# Patient Record
Sex: MALE | Race: BLACK OR AFRICAN AMERICAN
[De-identification: names, ages, dates, MRNs, and addresses within clinical notes are randomized per-mention and may not be internally consistent; named-entity substitution may affect disease eponyms.]

---

## 2017-10-02 ENCOUNTER — HOSPITAL ENCOUNTER (INPATIENT)
Dept: HOSPITAL 74 - YASAS | Age: 52
LOS: 4 days | Discharge: HOME | End: 2017-10-06
Attending: INTERNAL MEDICINE | Admitting: INTERNAL MEDICINE
Payer: COMMERCIAL

## 2017-10-02 VITALS — BODY MASS INDEX: 26.1 KG/M2

## 2017-10-02 DIAGNOSIS — G47.00: ICD-10-CM

## 2017-10-02 DIAGNOSIS — Z91.018: ICD-10-CM

## 2017-10-02 DIAGNOSIS — F17.213: ICD-10-CM

## 2017-10-02 DIAGNOSIS — R00.0: ICD-10-CM

## 2017-10-02 DIAGNOSIS — F25.9: ICD-10-CM

## 2017-10-02 DIAGNOSIS — F14.20: ICD-10-CM

## 2017-10-02 DIAGNOSIS — M25.562: ICD-10-CM

## 2017-10-02 DIAGNOSIS — K21.9: ICD-10-CM

## 2017-10-02 DIAGNOSIS — R76.11: ICD-10-CM

## 2017-10-02 DIAGNOSIS — F10.230: Primary | ICD-10-CM

## 2017-10-02 DIAGNOSIS — E78.5: ICD-10-CM

## 2017-10-02 LAB
APPEARANCE UR: (no result)
BILIRUB UR STRIP.AUTO-MCNC: 2 MG/DL
COLOR UR: (no result)
HYALINE CASTS URNS QL MICRO: 9 /LPF
KETONES UR QL STRIP: (no result)
LEUKOCYTE ESTERASE UR QL STRIP.AUTO: (no result)
MUCOUS THREADS URNS QL MICRO: (no result)
NITRITE UR QL STRIP: NEGATIVE
PH UR: 6 [PH] (ref 5–8)
PROT UR QL STRIP: (no result)
PROT UR QL STRIP: NEGATIVE
RBC # BLD AUTO: 2 /HPF (ref 0–3)
RBC # UR STRIP: NEGATIVE /UL
UROBILINOGEN UR STRIP-MCNC: (no result) MG/DL (ref 0.2–1)
WBC # UR AUTO: 4 /HPF (ref 3–5)

## 2017-10-02 PROCEDURE — HZ2ZZZZ DETOXIFICATION SERVICES FOR SUBSTANCE ABUSE TREATMENT: ICD-10-PCS | Performed by: SURGERY

## 2017-10-02 RX ADMIN — Medication SCH MG: at 21:42

## 2017-10-02 RX ADMIN — ATORVASTATIN CALCIUM SCH MG: 10 TABLET, FILM COATED ORAL at 21:42

## 2017-10-02 RX ADMIN — RANITIDINE SCH MG: 150 TABLET ORAL at 21:42

## 2017-10-02 NOTE — HP
CIWA Score





- CIWA Score


Nausea/Vomitin


Muscle Tremors: 4-Moderate,w/Arms Extend


Anxiety: 4-Mod. Anxious/Guarded


Agitation: 4-Moderately Restless


Paroxysmal Sweats: 1-Minimal Palms Moist


Orientation: 0-Oriented


Tacttile Disturbances: 0-None


Auditory Disturbances: 0-None


Visual Disturbances: 0-None


Headache: 0-None Present


CIWA-Ar Total Score: 15





Admission ROS [facility]





- HPI


Chief Complaint: 


WITHDRAWAL SX


Allergies/Adverse Reactions: 


 Allergies











Allergy/AdvReac Type Severity Reaction Status Date / Time


 


pork derived (porcine) Allergy Intermediate Rash Verified 10/02/17 18:07


 


No Known Drug Allergies Allergy   Verified 16 12:20











History of Present Illness: 


52 YEARS OLD MALE WITH LONG HISTORY OF ALCOHOL NICOTINE DEPENDENCE HAS POSITIVE 

PPD GERD HYPERLIPIDEMIA AND SCHIZOAFFECTIVE DISORDER IS ADMITTED TO DETOX


Exam Limitations: No Limitations





- Ebola screening


Have you traveled outside of the country in the last 21 days: No


Have you had contact with anyone from an Ebola affected area: No


Have you been sick,other than usual withdrawal symptoms: No


Do you have a fever: No





- Review of Systems


Constitutional: Changes in sleep, Weight Stable


EENT: reports: Blurred Vision (NEEDED EYE GLASSES)


Respiratory: reports: No Symptoms reported


Cardiac: reports: No Symptoms Reported


GI: reports: Nausea, Poor Fluid Intake, Indigestion, Abdominal cramping


: reports: No Symptoms Reported


Musculoskeletal: reports: Joint Pain (BOTH KNEES)


Integumentary: reports: No Symptoms Reported


Neuro: reports: Tremors


Endocrine: reports: No Symptoms Reported


Hematology: reports: No Symptoms Reported


Psychiatric: reports: Judgement Intact, Orientated x3, Anxious, Depressed


Other Systems: Reviewed and Negative





Patient History





- Patient Medical History


Hx Anemia: No


Hx Asthma: No


Hx Chronic Obstructive Pulmonary Disease (COPD): No


Hx Cancer: No


Hx Cardiac Disorders: No


Hx Congestive Heart Failure: No


Hx Hypertension: No


Hx Hypercholesterolemia: Yes (zocor 20mg)


Hx Pacemaker: No


HX Cerebrovascular Accident: No


Hx Seizures: No


Hx Dementia: No


Hx Diabetes: No


Hx Gastrointestinal Disorders: Yes (GERD)


Hx Liver Disease: No


Hx Genitourinary Disorders: No


Hx Sexually Transmitted Disorders: No


Hx Renal Disease (ESRD): No


Hx Thyroid Disease: No


Hx Human Immunodeficiency Virus (HIV): No (negative hx)


Hx Hepatitis C: No


Hx Depression: No


Hx Suicide Attempt: No


Hx Bipolar Disorder: No


Hx Schizophrenia: Yes





- Patient Surgical History


Past Surgical History: No


Hx Neurologic Surgery: No


Hx Cataract Extraction: No


Hx Cardiac Surgery: No


Hx Lung Surgery: No


Hx Breast Surgery: No


Hx Breast Biopsy: No


Hx Abdominal Surgery: No


Hx Appendectomy: No


Hx Cholecystectomy: No


Hx Genitourinary Surgery: No


Hx Orthopedic Surgery: No





- PPD History


Previous Implant?: Yes


Documented Results: Positive w/proof


Implanted On Prior [facility] Admission?: No


Date: 17


Results: NEGATIVE


PPD to be Administered?: No





- Smoking Cessation


Smoking history: Current every day smoker


Have you smoked in the past 12 months: Yes


Aproximately how many cigarettes per day: 10


Cigars Per Day: 0


Hx Chewing Tobacco Use: No


Initiated information on smoking cessation: Yes


'Breaking Loose' booklet given: 10/02/17





- Substance & Tx. History


Hx Alcohol Use: Yes


Hx Substance Use: No


Substance Use Type: Alcohol


Hx Substance Use Treatment: Yes (2017 [facility])





- Substances Abused


  ** Alcohol


Route: Oral


Frequency: Daily


Amount used: 3-4 24 oz cans beer/ 1 pint vodka


Age of first use: 11


Date of Last Use: 10/02/17





  ** Crack


Route: Smoking


Frequency: Daily


Amount used: $50-60


Age of first use: 26


Date of Last Use: 17





Family Disease History





- Family Disease History


Family Disease History: Heart Disease: Father (htn.alcohol,), Mother (

htn,), [place]: Brother (), Other: Father, Mother, Brother, Sister (

NO CONTACT)





Admission Physical Exam [facility]





- Vital Signs


Vital Signs: 


 Vital Signs - 24 hr











  10/02/17





  17:15


 


Temperature 97.5 F L


 


Pulse Rate 90


 


Respiratory 20





Rate 


 


Blood Pressure 116/98














- Physical


General Appearance: Yes: Appropriately Dressed, Moderate Distress, Tremorous, 

Irritable, Sweating, Anxious


HEENTM: Yes: Hearing grossly Normal, Normal ENT Inspection, Normocephalic, 

Normal Voice


Respiratory: Yes: Chest Non-Tender, Lungs Clear, Normal Breath Sounds, No 

Respiratory Distress, No Accessory Muscle Use


Neck: Yes: Supple, Trachea in good position


Breast: Yes: Breasts Symetrical


Cardiology: Yes: Regular Rhythm, S1, S2, Tachycardia


Abdominal: Yes: Non Tender, Soft


Genitourinary: Yes: Within Normal Limits


Back: Yes: Normal Inspection


Musculoskeletal: Yes: full range of Motion, Gait Steady, Back pain, Muscle Pain


Extremities: Yes: Normal Inspection, Normal Range of Motion, Non-Tender, Tremors


Neurological: Yes: Fully Oriented, Alert, Motor Strength 5/5, Normal Response, 

Depressed Affect


Integumentary: Yes: Warm


Lymphatic: Yes: Within Normal Limits





- Diagnostic


(1) Alcohol dependence with uncomplicated withdrawal


Current Visit: Yes   Status: Acute





(2) Nicotine dependence


Current Visit: Yes   Status: Acute   Qualifiers: 


     Nicotine product type: cigarettes     Substance use status: in withdrawal 

       Qualified Code(s): F17.213 - Nicotine dependence, cigarettes, with 

withdrawal; F17.213 - Nicotine dependence, cigarettes, with withdrawal  





(3) Schizoaffective disorder


Current Visit: Yes   Status: Suspected   Qualifiers: 


     Schizoaffective disorder type: unspecified        Qualified Code(s): F25.9 

- Schizoaffective disorder, unspecified; F25.9 - Schizoaffective disorder, 

unspecified; F25.9 - Schizoaffective disorder, unspecified; F25.9 - 

Schizoaffective disorder, unspecified  





(4) Hypercholesteremia


Current Visit: Yes   Status: Chronic





(5) Positive PPD, treated


Current Visit: Yes   Status: Resolved





(6) GERD (gastroesophageal reflux disease)


Current Visit: Yes   Status: Chronic   Qualifiers: 


     Esophagitis presence: without esophagitis        Qualified Code(s): K21.9 

- Gastro-esophageal reflux disease without esophagitis; K21.9 - Gastro-

esophageal reflux disease without esophagitis; K21.9 - Gastro-esophageal reflux 

disease without esophagitis  








Cleared for Admission [facility]





- Detox or Rehab


[facility] Level of Care: Medically Managed


Detox Regimen/Protocol: Librium





BHS Breath Alcohol Content


Breath Alcohol Content: 0





Urine Drug Screen





- Results


Drug Screen Negative: Yes

## 2017-10-03 LAB
ALBUMIN SERPL-MCNC: 3.7 G/DL (ref 3.4–5)
ALP SERPL-CCNC: 122 U/L (ref 45–117)
ALT SERPL-CCNC: 45 U/L (ref 12–78)
ANION GAP SERPL CALC-SCNC: 6 MMOL/L (ref 8–16)
AST SERPL-CCNC: 24 U/L (ref 15–37)
BILIRUB SERPL-MCNC: 1.2 MG/DL (ref 0.2–1)
CALCIUM SERPL-MCNC: 8.9 MG/DL (ref 8.5–10.1)
CO2 SERPL-SCNC: 27 MMOL/L (ref 21–32)
CREAT SERPL-MCNC: 0.9 MG/DL (ref 0.7–1.3)
DEPRECATED RDW RBC AUTO: 16.1 % (ref 11.9–15.9)
GLUCOSE SERPL-MCNC: 87 MG/DL (ref 74–106)
HIV 1 & 2 AB: NEGATIVE
HIV 1 AGP24: NEGATIVE
MCH RBC QN AUTO: 27.5 PG (ref 25.7–33.7)
MCHC RBC AUTO-ENTMCNC: 32.9 G/DL (ref 32–35.9)
MCV RBC: 83.5 FL (ref 80–96)
PLATELET # BLD AUTO: 203 K/MM3 (ref 134–434)
PMV BLD: 8.7 FL (ref 7.5–11.1)
PROT SERPL-MCNC: 6.7 G/DL (ref 6.4–8.2)
SP GR UR: 1.02 (ref 1–1.02)
WBC # BLD AUTO: 6 K/MM3 (ref 4–10)

## 2017-10-03 RX ADMIN — Medication SCH TAB: at 10:29

## 2017-10-03 RX ADMIN — ATORVASTATIN CALCIUM SCH MG: 10 TABLET, FILM COATED ORAL at 22:23

## 2017-10-03 RX ADMIN — MIRTAZAPINE SCH MG: 15 TABLET, FILM COATED ORAL at 22:24

## 2017-10-03 RX ADMIN — RANITIDINE SCH MG: 150 TABLET ORAL at 22:23

## 2017-10-03 RX ADMIN — RANITIDINE SCH MG: 150 TABLET ORAL at 10:29

## 2017-10-03 RX ADMIN — AMOXICILLIN AND CLAVULANATE POTASSIUM SCH TAB: 875; 125 TABLET, FILM COATED ORAL at 22:24

## 2017-10-03 RX ADMIN — Medication SCH MG: at 22:24

## 2017-10-03 RX ADMIN — AMOXICILLIN AND CLAVULANATE POTASSIUM SCH TAB: 875; 125 TABLET, FILM COATED ORAL at 10:28

## 2017-10-03 NOTE — PN
S CIWA





- CIWA Score


Nausea/Vomiting: 3


Muscle Tremors: 3


Anxiety: 3


Agitation: 2


Paroxysmal Sweats: 1-Minimal Palms Moist


Orientation: 0-Oriented


Tacttile Disturbances: 1-Very Mild Itch/Numbness


Auditory Disturbances: 1-Very Mild


Visual Disturbances: 0-None


Headache: 2-Mild


CIWA-Ar Total Score: 16





BHS Progress Note (SOAP)


Subjective: 


ALERT,IRRITABLE,ANXIOUS,INTERRUPTED SLEEP,TREMOR,PAIN IN THE BODY


Objective: 





10/03/17 10:02


 Vital Signs











Temperature  97.5 F L  10/03/17 06:00


 


Pulse Rate  75   10/03/17 06:00


 


Respiratory Rate  18   10/03/17 06:00


 


Blood Pressure  119/71   10/03/17 06:00


 


O2 Sat by Pulse Oximetry (%)      








EKG NSR,NORMAL ECG


 Laboratory Last Values











WBC  6.0 K/mm3 (4.0-10.0)   10/03/17  08:00    


 


RBC  4.97 M/mm3 (4.00-5.60)   10/03/17  08:00    


 


Hgb  13.6 GM/dL (11.7-16.9)   10/03/17  08:00    


 


Hct  41.5 % (35.4-49)   10/03/17  08:00    


 


MCV  83.5 fl (80-96)   10/03/17  08:00    


 


MCH  27.5 pg (25.7-33.7)   10/03/17  08:00    


 


MCHC  32.9 g/dl (32.0-35.9)   10/03/17  08:00    


 


RDW  16.1 % (11.9-15.9)  H  10/03/17  08:00    


 


Plt Count  203 K/MM3 (134-434)   10/03/17  08:00    


 


MPV  8.7 fl (7.5-11.1)   10/03/17  08:00    


 


Urine Color  Laura   10/02/17  23:10    


 


Urine Appearance  Slcloudy   10/02/17  23:10    


 


Urine pH  6.0  (5.0-8.0)   10/02/17  23:10    


 


Ur Specific Gravity  1.020  (1.005-1.025)   10/02/17  23:10    


 


Urine Protein  1+  (NEGATIVE)  H  10/02/17  23:10    


 


Urine Glucose (UA)  Negative  (NEGATIVE)   10/02/17  23:10    


 


Urine Ketones  Trace  (NEGATIVE)  H  10/02/17  23:10    


 


Urine Blood  Negative  (NEGATIVE)   10/02/17  23:10    


 


Urine Nitrite  Negative  (NEGATIVE)   10/02/17  23:10    


 


Urine Bilirubin  2.0  (NEGATIVE)   10/02/17  23:10    


 


Urine Urobilinogen  4.0 e.u/dl mg/dL (0.2-1.0)   10/02/17  23:10    


 


Urine RBC  2 /hpf (0-3)   10/02/17  23:10    


 


Urine WBC  4 /hpf (3-5)   10/02/17  23:10    


 


Ur Epithelial Cells  Rare /hpf (FEW)   10/02/17  23:10    


 


Hyaline Casts  9 /lpf  10/02/17  23:10    


 


Urine Mucus  Many   10/02/17  23:10    








LABS PENDING


Assessment: 





10/03/17 10:03


WITHDRAWAL SYMPTOM


Plan: 


CONTINUE DETOX

## 2017-10-03 NOTE — CONSULT
[facility] Psychiatric Consult





- Data


Date of interview: 10/03/17


Admission source: [facility]


Identifying data: Readmission to [facility] for this 53 y/o AA male seeking 

detox treatment on [address] for alcohol and cocaine (crack) dependence.Patient 

is single without children,homeless,unemployed and supported on SSI benefits.


Substance Abuse History: Discussed with patient in this session.Confirmed [facility] 

report.  Smoking Cessation.  Smoking history: Current every day smoker.  Have 

you smoked in the past 12 months: Yes.  Aproximately how many cigarettes per day

: 10.  Cigars Per Day: 0.  Hx Chewing Tobacco Use: No.  Initiated information 

on smoking cessation: Yes.  'Breaking Loose' booklet given: 10/02/17.  - 

Substance & Tx. History.  Hx Alcohol Use: Yes.  Hx Substance Use: No.  

Substance Use Type: Alcohol.  Hx Substance Use Treatment: Yes (7/2017 [facility]).  - 

Substances Abused.  ** Alcohol.  Route: Oral.  Frequency: Daily.  Amount used: 3

-4 24 oz cans beer/ 1 pint vodka.  Age of first use: 11.  Date of Last Use: 10/

02/17.  ** Crack.  Route: Smoking.  Frequency: Daily.  Amount used: $50-60.  

Age of first use: 26.  Date of Last Use: 09/27/17


Medical History: History of positive PPD (treated),GERD and dyslipidemia.


Psychiatric History: Diagnosed with Schizoaffective Disorder (2004).Multiple 

psychiatric hospitalizations ([facility]

).Patient reports that he gets OPD care at the [facility] 

clinic in [facility].Prescribed risperdal 4 mg/hs + remeron 45 mg/hs + celexa 20 

mg/day.Has been off medications for one month (self-report).Mr Lama admits to 

one suicide attempt (self-mutilation),years ago,at the death of his mother.


Physical/Sexual Abuse/Trauma History: No reported history of abuse.


Additional Comment: Drug Screen is negative.





Mental Status Exam





- Mental Status Exam


Alert and Oriented to: Time, Place, Person


Cognitive Function: Good


Patient Appearance: Well Groomed


Mood: Hopeful, Euthymic


Affect: Appropriate, Normal Range


Patient Behavior: Appropriate, Cooperative


Speech Pattern: Clear


Voice Loudness: Normal


Thought Process: Intact, Goal Oriented


Thought Disorder: Not Present


Hallucinations: Denies


Suicidal Ideation: Denies


Homicidal Ideation: Denies


Insight/Judgement: Poor


Sleep: Poorly, Difficulty falling asleep


Appetite: Good


Muscle strength/Tone: Normal


Gait/Station: Normal





Psychiatric Findings





- Problem List (Axis 1, 2,3)


(1) Schizoaffective disorder


Current Visit: Yes   Status: Chronic   Qualifiers: 


     Schizoaffective disorder type: unspecified        Qualified Code(s): F25.9 

- Schizoaffective disorder, unspecified; F25.9 - Schizoaffective disorder, 

unspecified; F25.9 - Schizoaffective disorder, unspecified; F25.9 - 

Schizoaffective disorder, unspecified  





(2) Alcohol dependence with uncomplicated withdrawal


Current Visit: Yes   Status: Acute





(3) Cocaine dependence, uncomplicated


Current Visit: Yes   Status: Acute





(4) Nicotine dependence


Current Visit: Yes   Status: Acute   Qualifiers: 


     Nicotine product type: cigarettes     Substance use status: in withdrawal 

       Qualified Code(s): F17.213 - Nicotine dependence, cigarettes, with 

withdrawal; F17.213 - Nicotine dependence, cigarettes, with withdrawal  





(5) GERD (gastroesophageal reflux disease)


Current Visit: Yes   Status: Chronic   Qualifiers: 


     Esophagitis presence: without esophagitis        Qualified Code(s): K21.9 

- Gastro-esophageal reflux disease without esophagitis; K21.9 - Gastro-

esophageal reflux disease without esophagitis; K21.9 - Gastro-esophageal reflux 

disease without esophagitis  





(6) Hypercholesteremia


Current Visit: Yes   Status: Chronic





(7) Positive PPD, treated


Current Visit: Yes   Status: Resolved





(8) Insomnia


Current Visit: Yes   Status: Acute   








- Initial Treatment Plan


Initial Treatment Plan: Psychoeducation.Detoxification.Medications : celexa 20 

mg po daily + remeron 15 mg po hs (titration to 45 mg will follow) + risperdal 

2 mg po bid.Side effects/benefits of each drug are discussed with the 

patient.Made aware,in particular,of potential for abnormal involuntary 

movements (dystonias,dyskinesias,akathisia),sexual impotence,galactorrhea,

gynecomastia,metabolic syndrome and cardiovascular adverse events (risperdal),

suicidal ideation/sexual dysfunction (celexa) and oversedation,orthostasis (

remeron).Patient endorses history of good tolerability to these 

medications.Insists on their continuation in this hospital course.Observation.

## 2017-10-03 NOTE — EKG
Test Reason : 

Blood Pressure : ***/*** mmHG

Vent. Rate : 078 BPM     Atrial Rate : 078 BPM

   P-R Int : 186 ms          QRS Dur : 072 ms

    QT Int : 386 ms       P-R-T Axes : 077 039 051 degrees

   QTc Int : 440 ms

 

NORMAL SINUS RHYTHM

NORMAL ECG

NO PREVIOUS ECGS AVAILABLE

Confirmed by HORTENSIA BURROUGHS MD ([id number]) on 10/3/2017 9:56:40 AM

 

Referred By:             Confirmed By:HORTENSIA BURROUGHS MD

## 2017-10-04 RX ADMIN — MIRTAZAPINE SCH MG: 15 TABLET, FILM COATED ORAL at 22:37

## 2017-10-04 RX ADMIN — RANITIDINE SCH MG: 150 TABLET ORAL at 10:18

## 2017-10-04 RX ADMIN — RANITIDINE SCH MG: 150 TABLET ORAL at 22:35

## 2017-10-04 RX ADMIN — Medication SCH TAB: at 10:18

## 2017-10-04 RX ADMIN — AMOXICILLIN AND CLAVULANATE POTASSIUM SCH TAB: 875; 125 TABLET, FILM COATED ORAL at 10:18

## 2017-10-04 RX ADMIN — Medication SCH MG: at 22:35

## 2017-10-04 RX ADMIN — ATORVASTATIN CALCIUM SCH MG: 10 TABLET, FILM COATED ORAL at 22:35

## 2017-10-04 RX ADMIN — AMOXICILLIN AND CLAVULANATE POTASSIUM SCH TAB: 875; 125 TABLET, FILM COATED ORAL at 22:35

## 2017-10-04 RX ADMIN — BACITRACIN ZINC SCH GM: 500 OINTMENT TOPICAL at 22:34

## 2017-10-04 RX ADMIN — CITALOPRAM HYDROBROMIDE SCH MG: 20 TABLET ORAL at 10:18

## 2017-10-04 NOTE — PN
S CIWA





- CIWA Score


Nausea/Vomiting: 3


Muscle Tremors: 3


Anxiety: 3


Agitation: 2


Paroxysmal Sweats: 1-Minimal Palms Moist


Orientation: 0-Oriented


Tacttile Disturbances: 1-Very Mild Itch/Numbness


Auditory Disturbances: 1-Very Mild


Visual Disturbances: 0-None


Headache: 2-Mild


CIWA-Ar Total Score: 16





BHS Progress Note (SOAP)


Subjective: 


alert,irritable,anxious,interrupted sleep,tremor,pain in the body and back


Objective: 





10/04/17 11:12


 Vital Signs











Temperature  98.3 F   10/04/17 10:06


 


Pulse Rate  70   10/04/17 10:06


 


Respiratory Rate  16   10/04/17 10:06


 


Blood Pressure  114/70   10/04/17 10:06


 


O2 Sat by Pulse Oximetry (%)      








 Laboratory Last Values











WBC  6.0 K/mm3 (4.0-10.0)   10/03/17  08:00    


 


RBC  4.97 M/mm3 (4.00-5.60)   10/03/17  08:00    


 


Hgb  13.6 GM/dL (11.7-16.9)   10/03/17  08:00    


 


Hct  41.5 % (35.4-49)   10/03/17  08:00    


 


MCV  83.5 fl (80-96)   10/03/17  08:00    


 


MCH  27.5 pg (25.7-33.7)   10/03/17  08:00    


 


MCHC  32.9 g/dl (32.0-35.9)   10/03/17  08:00    


 


RDW  16.1 % (11.9-15.9)  H  10/03/17  08:00    


 


Plt Count  203 K/MM3 (134-434)   10/03/17  08:00    


 


MPV  8.7 fl (7.5-11.1)   10/03/17  08:00    


 


Sodium  140 mmol/L (136-145)   10/03/17  08:00    


 


Potassium  4.0 mmol/L (3.5-5.1)   10/03/17  08:00    


 


Chloride  107 mmol/L ()   10/03/17  08:00    


 


Carbon Dioxide  27 mmol/L (21-32)   10/03/17  08:00    


 


Anion Gap  6  (8-16)  L  10/03/17  08:00    


 


BUN  14 mg/dL (7-18)   10/03/17  08:00    


 


Creatinine  0.9 mg/dL (0.7-1.3)   10/03/17  08:00    


 


Creat Clearance w eGFR  > 60  (>60)   10/03/17  08:00    


 


Random Glucose  87 mg/dL ()   10/03/17  08:00    


 


Calcium  8.9 mg/dL (8.5-10.1)   10/03/17  08:00    


 


Total Bilirubin  1.2 mg/dL (0.2-1.0)  H D 10/03/17  08:00    


 


AST  24 U/L (15-37)   10/03/17  08:00    


 


ALT  45 U/L (12-78)  D 10/03/17  08:00    


 


Alkaline Phosphatase  122 U/L ()  H D 10/03/17  08:00    


 


Total Protein  6.7 g/dl (6.4-8.2)   10/03/17  08:00    


 


Albumin  3.7 g/dl (3.4-5.0)   10/03/17  08:00    


 


Urine Color  Laura   10/02/17  23:10    


 


Urine Appearance  Slcloudy   10/02/17  23:10    


 


Urine pH  6.0  (5.0-8.0)   10/02/17  23:10    


 


Ur Specific Gravity  1.020  (1.005-1.025)   10/02/17  23:10    


 


Urine Protein  1+  (NEGATIVE)  H  10/02/17  23:10    


 


Urine Glucose (UA)  Negative  (NEGATIVE)   10/02/17  23:10    


 


Urine Ketones  Trace  (NEGATIVE)  H  10/02/17  23:10    


 


Urine Blood  Negative  (NEGATIVE)   10/02/17  23:10    


 


Urine Nitrite  Negative  (NEGATIVE)   10/02/17  23:10    


 


Urine Bilirubin  2.0  (NEGATIVE)   10/02/17  23:10    


 


Urine Urobilinogen  4.0 e.u/dl mg/dL (0.2-1.0)   10/02/17  23:10    


 


Urine RBC  2 /hpf (0-3)   10/02/17  23:10    


 


Urine WBC  4 /hpf (3-5)   10/02/17  23:10    


 


Ur Epithelial Cells  Rare /hpf (FEW)   10/02/17  23:10    


 


Hyaline Casts  9 /lpf  10/02/17  23:10    


 


Urine Mucus  Many   10/02/17  23:10    


 


RPR Titer  Nonreactive  (NONREACTIVE)   10/03/17  08:00    


 


HIV 1&2 Antibody Screen  Negative   10/03/17  08:00    


 


HIV P24 Antigen  Negative   10/03/17  08:00    











Assessment: 





10/04/17 11:13


withdrawal symptom


Plan: 


continue detox

## 2017-10-05 RX ADMIN — RANITIDINE SCH MG: 150 TABLET ORAL at 22:34

## 2017-10-05 RX ADMIN — MIRTAZAPINE SCH MG: 15 TABLET, FILM COATED ORAL at 22:34

## 2017-10-05 RX ADMIN — AMOXICILLIN AND CLAVULANATE POTASSIUM SCH TAB: 875; 125 TABLET, FILM COATED ORAL at 10:49

## 2017-10-05 RX ADMIN — BACITRACIN ZINC SCH GM: 500 OINTMENT TOPICAL at 10:49

## 2017-10-05 RX ADMIN — RANITIDINE SCH MG: 150 TABLET ORAL at 10:50

## 2017-10-05 RX ADMIN — BACITRACIN ZINC SCH GM: 500 OINTMENT TOPICAL at 22:40

## 2017-10-05 RX ADMIN — CITALOPRAM HYDROBROMIDE SCH MG: 20 TABLET ORAL at 10:50

## 2017-10-05 RX ADMIN — Medication SCH MG: at 22:35

## 2017-10-05 RX ADMIN — AMOXICILLIN AND CLAVULANATE POTASSIUM SCH TAB: 875; 125 TABLET, FILM COATED ORAL at 22:35

## 2017-10-05 RX ADMIN — ATORVASTATIN CALCIUM SCH MG: 10 TABLET, FILM COATED ORAL at 22:39

## 2017-10-05 RX ADMIN — Medication SCH TAB: at 10:49

## 2017-10-05 NOTE — PN
[facility] Progress Note


Note: 


received nurse call that the patient has muscle ache from spasm


flexile 5mg x 1 dose


continue detox

## 2017-10-05 NOTE — PN
[facility]HS Progress Note (SOAP)


Subjective: 


ALERT,IRRITABLE,ANXIOUS,INTERRUPTED SLEEP


Objective: 





10/05/17 12:01


 Vital Signs











Temperature  98 F   10/05/17 10:17


 


Pulse Rate  74   10/05/17 10:17


 


Respiratory Rate  20   10/05/17 10:17


 


Blood Pressure  134/73   10/05/17 10:17


 


O2 Sat by Pulse Oximetry (%)      











Assessment: 





10/05/17 12:01


WITHDRAWAL SYMPTOM


Plan: 


CONTINUE DETOX,DISCHARGE IN AM

## 2017-10-06 VITALS — SYSTOLIC BLOOD PRESSURE: 123 MMHG | DIASTOLIC BLOOD PRESSURE: 80 MMHG | HEART RATE: 100 BPM | TEMPERATURE: 97.7 F

## 2017-10-06 RX ADMIN — AMOXICILLIN AND CLAVULANATE POTASSIUM SCH TAB: 875; 125 TABLET, FILM COATED ORAL at 09:16

## 2017-10-06 RX ADMIN — Medication SCH TAB: at 09:16

## 2017-10-06 RX ADMIN — RANITIDINE SCH MG: 150 TABLET ORAL at 09:19

## 2017-10-06 RX ADMIN — BACITRACIN ZINC SCH GM: 500 OINTMENT TOPICAL at 09:16

## 2017-10-06 NOTE — PN
BHS Progress Note


Note: 


chest x ray cancelled yesterday patient had cat scan on 09/08/17 copy in chart,

has been follow up with pcp in [place]

## 2017-10-06 NOTE — DS
[facility] Detox Discharge Summary


Admission Date: 


10/02/17





Discharge Date: 10/06/17





- History


Present History: Alcohol Dependence, Cocaine Dependence


Additional Comments: 


follow up with after [facility] program as arrangement


Pertinent Past History: 


hypercolesterolemia


nicotine dependence


schizoaffective disorder


postitiv ppd treated


gerd





- Physical Exam Results


Vital Signs: 


 Vital Signs











Temperature  96.3 F L  10/06/17 07:48


 


Pulse Rate  68   10/06/17 07:48


 


Respiratory Rate  20   10/06/17 07:48


 


Blood Pressure  116/66   10/06/17 07:48


 


O2 Sat by Pulse Oximetry (%)      











Pertinent Admission Physical Exam Findings: 


withdrawal symptom





- Treatment


Hospital Course: Detox Protocol Followed, Detoxed Safely, Responded well, 

Discharged Condition Good


Patient has Accepted a Rehab Referral to: declined





- Medication


Discharge Medications: 


Ambulatory Orders





Simvastatin [Zocor -] 20 mg PO HS #30  05/17/15 


Mirtazapine [Remeron -] 45 mg PO HS #30 tablet 01/09/16 


Risperidone [Risperdal -] 4 mg PO HS #30 tablet 01/09/16 


Amoxicillin/Potassium Clav [Amox-Clav 875-125 mg Tablet] 1 each PO BID 10/02/17 


Citalopram Hydrobromide [Celexa -] 20 mg PO DAILY 10/02/17 


Citalopram Hydrobromide [Celexa -] 20 mg PO DAILY #30 tablet 10/03/17 


Mirtazapine [Remeron -] 30 mg PO HS #30 tablet 10/03/17 


Risperidone [Risperdal] 4 mg PO HS #30 tablet 10/03/17 











- Diagnosis


(1) Alcohol dependence with uncomplicated withdrawal


Current Visit: Yes   Status: Acute





(2) GERD (gastroesophageal reflux disease)


Current Visit: Yes   Status: Chronic   Qualifiers: 


     Esophagitis presence: without esophagitis        Qualified Code(s): K21.9 

- Gastro-esophageal reflux disease without esophagitis; K21.9 - Gastro-

esophageal reflux disease without esophagitis; K21.9 - Gastro-esophageal reflux 

disease without esophagitis  





(3) Hypercholesteremia


Current Visit: Yes   Status: Chronic





(4) Schizoaffective disorder


Current Visit: Yes   Status: Chronic   Qualifiers: 


     Schizoaffective disorder type: unspecified        Qualified Code(s): F25.9 

- Schizoaffective disorder, unspecified; F25.9 - Schizoaffective disorder, 

unspecified; F25.9 - Schizoaffective disorder, unspecified; F25.9 - 

Schizoaffective disorder, unspecified  





(5) Positive PPD, treated


Current Visit: Yes   Status: Resolved





(6) Left knee pain


Current Visit: No   Status: Chronic   








- AMA


Did Patient Leave Against Medical Advice: No

## 2019-11-17 ENCOUNTER — HOSPITAL ENCOUNTER (INPATIENT)
Dept: HOSPITAL 74 - YASAS | Age: 54
LOS: 4 days | Discharge: HOME | End: 2019-11-21
Attending: ALLERGY & IMMUNOLOGY | Admitting: ALLERGY & IMMUNOLOGY
Payer: COMMERCIAL

## 2019-11-17 VITALS — BODY MASS INDEX: 22.6 KG/M2

## 2019-11-17 DIAGNOSIS — G89.29: ICD-10-CM

## 2019-11-17 DIAGNOSIS — F10.230: Primary | ICD-10-CM

## 2019-11-17 DIAGNOSIS — F25.9: ICD-10-CM

## 2019-11-17 DIAGNOSIS — F17.210: ICD-10-CM

## 2019-11-17 DIAGNOSIS — F12.20: ICD-10-CM

## 2019-11-17 DIAGNOSIS — F19.282: ICD-10-CM

## 2019-11-17 DIAGNOSIS — K21.9: ICD-10-CM

## 2019-11-17 DIAGNOSIS — E78.00: ICD-10-CM

## 2019-11-17 DIAGNOSIS — F16.20: ICD-10-CM

## 2019-11-17 DIAGNOSIS — F14.20: ICD-10-CM

## 2019-11-17 DIAGNOSIS — M25.562: ICD-10-CM

## 2019-11-17 DIAGNOSIS — Z91.018: ICD-10-CM

## 2019-11-17 PROCEDURE — HZ2ZZZZ DETOXIFICATION SERVICES FOR SUBSTANCE ABUSE TREATMENT: ICD-10-PCS | Performed by: ALLERGY & IMMUNOLOGY

## 2019-11-17 PROCEDURE — G0009 ADMIN PNEUMOCOCCAL VACCINE: HCPCS

## 2019-11-17 RX ADMIN — Medication PRN MG: at 22:15

## 2019-11-17 RX ADMIN — NICOTINE SCH: 14 PATCH, EXTENDED RELEASE TRANSDERMAL at 14:01

## 2019-11-17 RX ADMIN — Medication SCH MG: at 22:14

## 2019-11-17 RX ADMIN — ATORVASTATIN CALCIUM SCH MG: 10 TABLET, FILM COATED ORAL at 22:14

## 2019-11-17 NOTE — HP
CIWA Score


Nausea/Vomitin


Muscle Tremors: 2


Anxiety: 2


Agitation: 2


Paroxysmal Sweats: 2


Orientation: 0-Oriented


Tacttile Disturbances: 2-Mild Itch/Numbness/Burn


Auditory Disturbances: 0-None


Visual Disturbances: 0-None


Headache: 2-Mild


CIWA-Ar Total Score: 14





- Admission Criteria


[facility] Guidelines: Admission for Medically Managed Detox: 


Requires at least one of the followin. CIWA greater than 12


2. Seizures within the past 24 hours


3. Delirium tremens within the past 24 hours


4. Hallucinations within the past 24 hours


5. Acute intervention needed for co  occurring medical disorder


6. Acute intervention needed for co  occurring psychiatric disorder


7. Severe withdrawal that cannot be handled at a lower level of care (continued


    vomiting, continued diarrhea, abnormal vital signs) requiring intravenous


    medication and/or fluids


8. Pregnancy





Patient presents the following: CIWA greater than 12


Admission Criteria Met: Admission criteria met





Admitting History and Physical





- Smoking History


Smoking history: Current every day smoker


Have you smoked in the past 12 months: Yes


Aproximately how many cigarettes per day: 10





- Alcohol/Substance Use


Hx Alcohol Use: Yes





Admission ROS [facility]





- [facility]


Chief Complaint: 





I need detox


Allergies/Adverse Reactions: 


 Allergies











Allergy/AdvReac Type Severity Reaction Status Date / Time


 


pork derived (porcine) Allergy Intermediate Rash Verified 10/02/17 18:07


 


No Known Drug Allergies Allergy   Verified 16 12:20











History of Present Illness: 





54 year old man with alcohol use presents for detox, his last treatment at [facility] 

was in 2017. Patient is admitted in no apparent distress.


Exam Limitations: No Limitations





- Ebola screening


Have you traveled outside of the country in the last 21 days: No (N)


Have you had contact with anyone from an Ebola affected area: No


Have you been sick,other than usual withdrawal symptoms: No


Do you have a fever: No





- Review of Systems


Constitutional: Chills, Changes in sleep


EENT: reports: Other (wears eye glasses)


Respiratory: reports: Cough


Cardiac: reports: No Symptoms Reported


GI: reports: Poor Appetite, Abdominal cramping


: reports: No Symptoms Reported


Musculoskeletal: reports: Back Pain, Joint Pain, Muscle Pain, Muscle Weakness


Integumentary: reports: Dryness


Neuro: reports: Headache, Numbness, Tremors


Endocrine: reports: No Symptoms Reported


Hematology: reports: No Symptoms Reported


Psychiatric: reports: Anxious, Depressed


Other Systems: Reviewed and Negative





Patient History





- Patient Medical History


Hx Anemia: No


Hx Asthma: No


Hx Chronic Obstructive Pulmonary Disease (COPD): No


Hx Cancer: No


Hx Cardiac Disorders: No


Hx Congestive Heart Failure: No


Hx Hypertension: No


Hx Hypercholesterolemia: Yes


Hx Pacemaker: No


HX Cerebrovascular Accident: No


Hx Seizures: No


Hx Dementia: No


Hx Diabetes: No


Hx Gastrointestinal Disorders: Yes (GERD)


Hx Liver Disease: No


Hx Genitourinary Disorders: No


Hx Sexually Transmitted Disorders: No


Hx Renal Disease (ESRD): No


Hx Thyroid Disease: No


Hx Human Immunodeficiency Virus (HIV): No


Hx Hepatitis C: No


Hx Depression: Yes


Hx Suicide Attempt: No


Hx Bipolar Disorder: No


Hx Schizophrenia: Yes





- Patient Surgical History


Past Surgical History: No





- PPD History


Previous Implant?: Yes


Documented Results: Negative w/proof


Implanted On Prior [facility] Admission?: Yes


Date: 17


Results: NEGATIVE


PPD to be Administered?: Yes





- Smoking Cessation


Smoking history: Current every day smoker


Have you smoked in the past 12 months: Yes


Aproximately how many cigarettes per day: 20


Cigars Per Day: 0


Hx Chewing Tobacco Use: No


Initiated information on smoking cessation: Yes


'Breaking Loose' booklet given: 19





Admission Physical Exam [facility]





- Physical


General Appearance: Yes: No Apparent Distress, Irritable


HEENTM: Yes: Hearing grossly Normal, Normal ENT Inspection, Normocephalic


Respiratory: Yes: Chest Non-Tender, Lungs Clear, No Respiratory Distress, No 

Accessory Muscle Use


Neck: Yes: No masses,lesions,Nodules, Supple


Breast: Yes: Breast Exam Deferred


Cardiology: Yes: Regular Rhythm, Regular Rate, S1, S2


Abdominal: Yes: Normal Bowel Sounds, Soft


Genitourinary: Yes: Within Normal Limits


Back: Yes: Within Normal Limits


Musculoskeletal: Yes: Back pain, Muscle Pain, Muscle weakness


Extremities: Yes: Non-Tender, Tremors


Neurological: Yes: CNs II-XII NML intact, Fully Oriented, Alert, Motor Strength 

5/5, Normal Mood/Affect, Normal Response


Integumentary: Yes: Normal Color, Dry


Lymphatic: Yes: Within Normal Limits





- Diagnostic


(1) Alcohol dependence with uncomplicated withdrawal


Current Visit: Yes   Status: Acute   





(2) Cocaine dependence, uncomplicated


Current Visit: No   Status: Acute   





(3) Insomnia


Current Visit: No   Status: Acute   


Qualifiers: 


   Insomnia type: alcohol-induced   Qualified Code(s): F10.982 - Alcohol use, 

unspecified with alcohol-induced sleep disorder   





(4) Nicotine dependence


Current Visit: Yes   Status: Acute   


Qualifiers: 


   Nicotine product type: cigarettes   Substance use status: uncomplicated   

Qualified Code(s): F17.210 - Nicotine dependence, cigarettes, uncomplicated   





(5) Cannabis dependence, uncomplicated


Current Visit: Yes   Status: Chronic   





(6) GERD (gastroesophageal reflux disease)


Current Visit: No   Status: Chronic   


Qualifiers: 


   Esophagitis presence: without esophagitis   Qualified Code(s): K21.9 - Gastro

-esophageal reflux disease without esophagitis   





(7) Hypercholesteremia


Current Visit: No   Status: Chronic   





(8) Left knee pain


Current Visit: No   Status: Acute   


Qualifiers: 


   Chronicity: chronic   Qualified Code(s): M25.562 - Pain in left knee; G89.29 

- Other chronic pain   





Cleared for Admission [facility]





- Detox or Rehab


[facility] Level of Care: Medically Managed


Detox Regimen/Protocol: Librium


Claeared for Rehab Admission: No





Breathalyzer





- Breathalyzer


Breathalyzer: 0





Urine Drug Screen





- Test Device


Lot number: [id number]


Expiration date: 21





- Control


Is test valid?: Yes





- Results


Drug screen NEGATIVE: No


Urine drug screen results: THC-Marijuana, OMKAR-Cocaine





Inpatient Rehab Admission





- Rehab Decision to Admit


Inpatient rehab admission?: No

## 2019-11-18 LAB
ALBUMIN SERPL-MCNC: 3.6 G/DL (ref 3.4–5)
ALP SERPL-CCNC: 90 U/L (ref 45–117)
ALT SERPL-CCNC: 16 U/L (ref 13–61)
ANION GAP SERPL CALC-SCNC: 6 MMOL/L (ref 8–16)
AST SERPL-CCNC: 13 U/L (ref 15–37)
BILIRUB SERPL-MCNC: 0.4 MG/DL (ref 0.2–1)
BUN SERPL-MCNC: 9.5 MG/DL (ref 7–18)
CALCIUM SERPL-MCNC: 8.8 MG/DL (ref 8.5–10.1)
CHLORIDE SERPL-SCNC: 112 MMOL/L (ref 98–107)
CO2 SERPL-SCNC: 25 MMOL/L (ref 21–32)
CREAT SERPL-MCNC: 0.9 MG/DL (ref 0.55–1.3)
DEPRECATED RDW RBC AUTO: 15.4 % (ref 11.9–15.9)
GLUCOSE SERPL-MCNC: 100 MG/DL (ref 74–106)
HCT VFR BLD CALC: 37.9 % (ref 35.4–49)
HGB BLD-MCNC: 12.5 GM/DL (ref 11.7–16.9)
MCH RBC QN AUTO: 27.8 PG (ref 25.7–33.7)
MCHC RBC AUTO-ENTMCNC: 33.1 G/DL (ref 32–35.9)
MCV RBC: 84 FL (ref 80–96)
PLATELET # BLD AUTO: 225 K/MM3 (ref 134–434)
PMV BLD: 8.5 FL (ref 7.5–11.1)
POTASSIUM SERPLBLD-SCNC: 3.7 MMOL/L (ref 3.5–5.1)
PROT SERPL-MCNC: 6.1 G/DL (ref 6.4–8.2)
RBC # BLD AUTO: 4.51 M/MM3 (ref 4–5.6)
SODIUM SERPL-SCNC: 143 MMOL/L (ref 136–145)
WBC # BLD AUTO: 4.7 K/MM3 (ref 4–10)

## 2019-11-18 RX ADMIN — CITALOPRAM HYDROBROMIDE SCH MG: 20 TABLET ORAL at 20:17

## 2019-11-18 RX ADMIN — Medication PRN MG: at 20:18

## 2019-11-18 RX ADMIN — Medication SCH TAB: at 10:29

## 2019-11-18 RX ADMIN — NICOTINE SCH: 14 PATCH, EXTENDED RELEASE TRANSDERMAL at 10:29

## 2019-11-18 RX ADMIN — ATORVASTATIN CALCIUM SCH MG: 10 TABLET, FILM COATED ORAL at 21:08

## 2019-11-18 RX ADMIN — MIRTAZAPINE SCH MG: 15 TABLET, FILM COATED ORAL at 20:17

## 2019-11-18 RX ADMIN — Medication SCH MG: at 21:09

## 2019-11-18 RX ADMIN — ONDANSETRON PRN MG: 4 TABLET, ORALLY DISINTEGRATING ORAL at 22:41
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
## 2019-11-18 NOTE — CONSULT
[facility] Psychiatric Consult





- Data


Date of interview: 11/18/19


Admission source: Self-referred


Identifying data: Mr Lama is a 54 years old sinle Black male, unemployed 

receiving SSI, homeless seeking detox treatment for alcohol and cocaine


Substance Abuse History: Reports history of alcohol and cocaine use. Refer to 

addiction counselor's summary for further information


Medical History: Significant for GERD, dyslipidemia and history of treatment 

for positive PPD.


Psychiatric History: Diagnosed with Schizoaffective Disorder (2004).Multiple 

psychiatric hospitalizations ([facility]

).Patient reports that he gets OPD care at the [facility] in [facility].Prescribed risperdal 4 mg/hs + remeron 45 mg/hs + celexa 20 

mg/day.Has been off medications for one month (self-report).Mr Lama admits to 

one suicide attempt (self-mutilation),years ago,at the death of his mother.





Psychiatric Findings





- Problem List (Axis 1, 2,3)


(1) Schizoaffective disorder


Current Visit: No   Status: Chronic   


Qualifiers: 


   Schizoaffective disorder type: unspecified   Qualified Code(s): F25.9 - 

Schizoaffective disorder, unspecified

## 2019-11-18 NOTE — PN
S CIWA





- CIWA Score


Nausea/Vomitin-No Nausea/No Vomiting


Muscle Tremors: 3


Anxiety: 2


Agitation: 2


Paroxysmal Sweats: 2


Orientation: 0-Oriented


Tacttile Disturbances: 0-None


Auditory Disturbances: 0-None


Visual Disturbances: 0-None


Headache: 0-None Present


CIWA-Ar Total Score: 9





[facility] Progress Note (SOAP)


Subjective: 





sweats


shakes


interrupted sleep


body aches


Objective: 





19 12:10


 Vital Signs











Temperature  97.9 F   19 09:27


 


Pulse Rate  66   19 09:27


 


Respiratory Rate  16   19 09:27


 


Blood Pressure  99/57 L  19 09:27


 


O2 Sat by Pulse Oximetry (%)      








pending labs 


aaox3


ambulating


no acute distress


Assessment: 





19 12:10


withdrawal sx


Plan: 





continue detox


increase fluids

## 2019-11-19 RX ADMIN — ATORVASTATIN CALCIUM SCH MG: 10 TABLET, FILM COATED ORAL at 21:11

## 2019-11-19 RX ADMIN — NICOTINE SCH: 14 PATCH, EXTENDED RELEASE TRANSDERMAL at 10:11

## 2019-11-19 RX ADMIN — Medication SCH TAB: at 10:11

## 2019-11-19 RX ADMIN — CITALOPRAM HYDROBROMIDE SCH MG: 20 TABLET ORAL at 20:11

## 2019-11-19 RX ADMIN — ONDANSETRON PRN MG: 4 TABLET, ORALLY DISINTEGRATING ORAL at 10:49

## 2019-11-19 RX ADMIN — Medication SCH MG: at 21:12

## 2019-11-19 RX ADMIN — MIRTAZAPINE SCH MG: 15 TABLET, FILM COATED ORAL at 20:11

## 2019-11-19 NOTE — PN
S CIWA





- CIWA Score


Nausea/Vomitin-No Nausea/No Vomiting


Muscle Tremors: 2


Anxiety: 1-Mildly Anxious


Agitation: 2


Paroxysmal Sweats: 1-Minimal Palms Moist


Orientation: 0-Oriented


Tacttile Disturbances: 0-None


Auditory Disturbances: 0-None


Visual Disturbances: 0-None


Headache: 0-None Present


CIWA-Ar Total Score: 6





BHS Progress Note (SOAP)


Subjective: 





sweats


interrupted sleep


mild shakes


Objective: 





19 10:40


 Vital Signs











Temperature  99.3 F   19 10:23


 


Pulse Rate  81   19 10:23


 


Respiratory Rate  18   19 10:23


 


Blood Pressure  102/69   19 10:23


 


O2 Sat by Pulse Oximetry (%)      








 Laboratory Tests











  19





  08:15 08:15 08:15


 


WBC  4.7  


 


RBC  4.51  


 


Hgb  12.5  


 


Hct  37.9  


 


MCV  84.0  


 


MCH  27.8  


 


MCHC  33.1  


 


RDW  15.4  


 


Plt Count  225  


 


MPV  8.5  


 


Sodium   143 


 


Potassium   3.7 


 


Chloride   112 H 


 


Carbon Dioxide   25 


 


Anion Gap   6 L 


 


BUN   9.5 


 


Creatinine   0.9 


 


Est GFR (CKD-EPI)AfAm   110.27 


 


Est GFR (CKD-EPI)NonAf   95.14 


 


Random Glucose   100 


 


Calcium   8.8 


 


Total Bilirubin   0.4 


 


AST   13 L 


 


ALT   16 


 


Alkaline Phosphatase   90 


 


Total Protein   6.1 L 


 


Albumin   3.6 


 


RPR Titer    Nonreactive


 


HIV 1&2 Antibody Screen   


 


HIV P24 Antigen   














  19





  08:15


 


WBC 


 


RBC 


 


Hgb 


 


Hct 


 


MCV 


 


MCH 


 


MCHC 


 


RDW 


 


Plt Count 


 


MPV 


 


Sodium 


 


Potassium 


 


Chloride 


 


Carbon Dioxide 


 


Anion Gap 


 


BUN 


 


Creatinine 


 


Est GFR (CKD-EPI)AfAm 


 


Est GFR (CKD-EPI)NonAf 


 


Random Glucose 


 


Calcium 


 


Total Bilirubin 


 


AST 


 


ALT 


 


Alkaline Phosphatase 


 


Total Protein 


 


Albumin 


 


RPR Titer 


 


HIV 1&2 Antibody Screen  Negative


 


HIV P24 Antigen  Negative








labs noted


aaox3


ambulating


no acute distress





Assessment: 





19 11:06


mild withdrawals


Plan: 





continue detox


increase fluids

## 2019-11-20 RX ADMIN — ONDANSETRON PRN MG: 4 TABLET, ORALLY DISINTEGRATING ORAL at 16:53

## 2019-11-20 RX ADMIN — CITALOPRAM HYDROBROMIDE SCH MG: 20 TABLET ORAL at 19:48

## 2019-11-20 RX ADMIN — Medication SCH: at 23:41

## 2019-11-20 RX ADMIN — ATORVASTATIN CALCIUM SCH: 10 TABLET, FILM COATED ORAL at 23:41

## 2019-11-20 RX ADMIN — NICOTINE SCH: 14 PATCH, EXTENDED RELEASE TRANSDERMAL at 09:40

## 2019-11-20 RX ADMIN — MIRTAZAPINE SCH MG: 15 TABLET, FILM COATED ORAL at 19:49

## 2019-11-20 RX ADMIN — Medication SCH TAB: at 09:40

## 2019-11-20 NOTE — PN
[facility] CIWA





- CIWA Score


Nausea/Vomitin-No Nausea/No Vomiting


Muscle Tremors: 1-None Visible, but Felt


Anxiety: 1-Mildly Anxious


Agitation: 1-Slight > Activity


Paroxysmal Sweats: No Perspiration


Orientation: 0-Oriented


Tacttile Disturbances: 0-None


Auditory Disturbances: 0-None


Visual Disturbances: 0-None


Headache: 0-None Present


CIWA-Ar Total Score: 3





BHS Progress Note (SOAP)


Subjective: 





anxiety


Objective: 





19 11:10


 Vital Signs











Temperature  96.8 F L  19 09:37


 


Pulse Rate  108 H  19 09:37


 


Respiratory Rate  20   19 09:37


 


Blood Pressure  120/66   19 09:37


 


O2 Sat by Pulse Oximetry (%)      








aaox3


ambulating


no acute distress


Assessment: 





19 11:10


mild withdrawal sx


Plan: 





continue detox


d/c in am

## 2019-11-21 VITALS — HEART RATE: 96 BPM | SYSTOLIC BLOOD PRESSURE: 108 MMHG | TEMPERATURE: 97.7 F | DIASTOLIC BLOOD PRESSURE: 65 MMHG

## 2019-11-21 RX ADMIN — Medication SCH TAB: at 10:22

## 2019-11-21 RX ADMIN — NICOTINE SCH: 14 PATCH, EXTENDED RELEASE TRANSDERMAL at 10:22

## 2019-11-21 NOTE — DS
[facility] Detox Discharge Summary


Admission Date: 


11/17/19





Discharge Date: 11/21/19





- History


Present History: Alcohol Dependence, Cannabis Dependence, Cocaine Dependence, 

Pcp Dependence





- Physical Exam Results


Vital Signs: 


 Vital Signs











Temperature  97.0 F L  11/21/19 06:25


 


Pulse Rate  77   11/21/19 06:25


 


Respiratory Rate  18   11/21/19 06:25


 


Blood Pressure  124/80   11/21/19 06:25


 


O2 Sat by Pulse Oximetry (%)      











Pertinent Admission Physical Exam Findings: 





pt arrived in withdrawals


 Vital Signs











Temperature  97.0 F L  11/21/19 06:25


 


Pulse Rate  77   11/21/19 06:25


 


Respiratory Rate  18   11/21/19 06:25


 


Blood Pressure  124/80   11/21/19 06:25


 


O2 Sat by Pulse Oximetry (%)      








 Laboratory Tests











  11/18/19 11/18/19 11/18/19





  08:15 08:15 08:15


 


WBC  4.7  


 


RBC  4.51  


 


Hgb  12.5  


 


Hct  37.9  


 


MCV  84.0  


 


MCH  27.8  


 


MCHC  33.1  


 


RDW  15.4  


 


Plt Count  225  


 


MPV  8.5  


 


Sodium   143 


 


Potassium   3.7 


 


Chloride   112 H 


 


Carbon Dioxide   25 


 


Anion Gap   6 L 


 


BUN   9.5 


 


Creatinine   0.9 


 


Est GFR (CKD-EPI)AfAm   110.27 


 


Est GFR (CKD-EPI)NonAf   95.14 


 


Random Glucose   100 


 


Calcium   8.8 


 


Total Bilirubin   0.4 


 


AST   13 L 


 


ALT   16 


 


Alkaline Phosphatase   90 


 


Total Protein   6.1 L 


 


Albumin   3.6 


 


RPR Titer    Nonreactive


 


HIV 1&2 Antibody Screen   


 


HIV P24 Antigen   














  11/18/19





  08:15


 


WBC 


 


RBC 


 


Hgb 


 


Hct 


 


MCV 


 


MCH 


 


MCHC 


 


RDW 


 


Plt Count 


 


MPV 


 


Sodium 


 


Potassium 


 


Chloride 


 


Carbon Dioxide 


 


Anion Gap 


 


BUN 


 


Creatinine 


 


Est GFR (CKD-EPI)AfAm 


 


Est GFR (CKD-EPI)NonAf 


 


Random Glucose 


 


Calcium 


 


Total Bilirubin 


 


AST 


 


ALT 


 


Alkaline Phosphatase 


 


Total Protein 


 


Albumin 


 


RPR Titer 


 


HIV 1&2 Antibody Screen  Negative


 


HIV P24 Antigen  Negative








pt is aaox3


ambulating


no acute distress


no s/s of withdrawals





- Treatment


Hospital Course: Detox Protocol Followed, Detoxed Safely, Responded well, 

Discharged Condition Good, Rehab Referral Accepted


Patient has Accepted a Rehab Referral to: pt referred to [facility]





- Diagnosis


(1) Alcohol dependence with uncomplicated withdrawal


Current Visit: Yes   Status: Chronic   





(2) Phencyclidine dependence


Current Visit: Yes   Status: Chronic   





(3) Substance-induced sleep disorder


Current Visit: Yes   Status: Acute   





(4) Cannabis dependence, uncomplicated


Current Visit: Yes   Status: Chronic   





(5) Nicotine dependence


Current Visit: Yes   Status: Chronic   


Qualifiers: 


   Nicotine product type: cigarettes   Substance use status: uncomplicated   

Qualified Code(s): F17.210 - Nicotine dependence, cigarettes, uncomplicated   





(6) Cocaine dependence, uncomplicated


Current Visit: No   Status: Acute   





(7) Insomnia


Current Visit: No   Status: Acute   


Qualifiers: 


   Insomnia type: alcohol-induced   Qualified Code(s): F10.982 - Alcohol use, 

unspecified with alcohol-induced sleep disorder   





(8) GERD (gastroesophageal reflux disease)


Current Visit: No   Status: Chronic   


Qualifiers: 


   Esophagitis presence: without esophagitis   Qualified Code(s): K21.9 - Gastro

-esophageal reflux disease without esophagitis   





(9) Hypercholesteremia


Current Visit: No   Status: Chronic   





(10) Left knee pain


Current Visit: No   Status: Chronic   


Qualifiers: 


   Chronicity: chronic   Qualified Code(s): M25.562 - Pain in left knee; G89.29 

- Other chronic pain   





(11) Schizoaffective disorder


Current Visit: No   Status: Chronic   


Qualifiers: 


   Schizoaffective disorder type: unspecified   Qualified Code(s): F25.9 - 

Schizoaffective disorder, unspecified   





- AMA


Did Patient Leave Against Medical Advice: No

## 2024-02-11 ENCOUNTER — EMERGENCY (EMERGENCY)
Facility: HOSPITAL | Age: 59
LOS: 1 days | Discharge: HOME | End: 2024-02-11
Attending: EMERGENCY MEDICINE | Admitting: EMERGENCY MEDICINE
Payer: MEDICAID

## 2024-02-11 VITALS
SYSTOLIC BLOOD PRESSURE: 135 MMHG | HEART RATE: 67 BPM | DIASTOLIC BLOOD PRESSURE: 90 MMHG | TEMPERATURE: 97 F | OXYGEN SATURATION: 98 % | RESPIRATION RATE: 16 BRPM

## 2024-02-11 PROCEDURE — 99284 EMERGENCY DEPT VISIT MOD MDM: CPT

## 2024-02-11 PROCEDURE — 71101 X-RAY EXAM UNILAT RIBS/CHEST: CPT | Mod: 26,LT

## 2024-02-11 RX ORDER — IBUPROFEN 200 MG
600 TABLET ORAL ONCE
Refills: 0 | Status: COMPLETED | OUTPATIENT
Start: 2024-02-11 | End: 2024-02-11

## 2024-02-11 RX ADMIN — Medication 600 MILLIGRAM(S): at 15:53

## 2024-02-11 NOTE — ED PROVIDER NOTE - NSFOLLOWUPINSTRUCTIONS_ED_ALL_ED_FT
1. stay well hydrated  2. take all medications as directed without fail  3. follow up with your primary care doctor in 1 -2 days  4. return to ER if your symptoms worsen or for any other concern    Follow up with your primary care doctor or clinics listed below if you do not have a doctor  [address] [address]  To make an appointment, call [phone]  [facility]  Address: [address] [place] [id number]  Appointment Center: [phone] ([phone])
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
## 2024-02-11 NOTE — ED PROVIDER NOTE - MUSCULOSKELETAL, MLM
Detail Level: Detailed No cervical, thoracic ot lumbar spine tenderness to percussion or palpation. Flexion/extension of spine without pain. + left lateral cw tenderness skin intact no step off no ecchymosis

## 2024-02-11 NOTE — ED ADULT NURSE NOTE - NSFALLUNIVINTERV_ED_ALL_ED
Bed/Stretcher in lowest position, wheels locked, appropriate side rails in place/Call bell, personal items and telephone in reach/Instruct patient to call for assistance before getting out of bed/chair/stretcher/Non-slip footwear applied when patient is off stretcher/[place] to call system/Physically safe environment - no spills, clutter or unnecessary equipment/Purposeful proactive rounding/Room/bathroom lighting operational, light cord in reach
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
## 2024-02-11 NOTE — ED PROVIDER NOTE - PATIENT PORTAL LINK FT
You can access the FollowMyHealth Patient Portal offered by [facility] by registering at the following website: [email or web address]. By joining [facility]’s FollowMyHealth portal, you will also be able to view your health information using other applications (apps) compatible with our system.
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
## 2024-02-14 DIAGNOSIS — R07.89 OTHER CHEST PAIN: ICD-10-CM

## 2024-02-14 DIAGNOSIS — S20.212A CONTUSION OF LEFT FRONT WALL OF THORAX, INITIAL ENCOUNTER: ICD-10-CM
